# Patient Record
Sex: MALE | Race: OTHER | Employment: FULL TIME | URBAN - METROPOLITAN AREA
[De-identification: names, ages, dates, MRNs, and addresses within clinical notes are randomized per-mention and may not be internally consistent; named-entity substitution may affect disease eponyms.]

---

## 2017-01-23 ENCOUNTER — GENERIC CONVERSION - ENCOUNTER (OUTPATIENT)
Dept: OTHER | Facility: OTHER | Age: 47
End: 2017-01-23

## 2017-01-23 LAB — AMBIG ABBREV CMP14 DEFAULT (HISTORICAL): NORMAL

## 2017-01-24 ENCOUNTER — GENERIC CONVERSION - ENCOUNTER (OUTPATIENT)
Dept: OTHER | Facility: OTHER | Age: 47
End: 2017-01-24

## 2017-01-24 ENCOUNTER — ALLSCRIPTS OFFICE VISIT (OUTPATIENT)
Dept: OTHER | Facility: OTHER | Age: 47
End: 2017-01-24

## 2017-01-24 LAB
A/G RATIO (HISTORICAL): 1.9 (ref 1.1–2.5)
ALBUMIN SERPL BCP-MCNC: 4.7 G/DL (ref 3.5–5.5)
ALP SERPL-CCNC: 54 IU/L (ref 39–117)
ALT SERPL W P-5'-P-CCNC: 39 IU/L (ref 0–44)
AST SERPL W P-5'-P-CCNC: 22 IU/L (ref 0–40)
BILIRUB SERPL-MCNC: 0.3 MG/DL (ref 0–1.2)
BUN SERPL-MCNC: 17 MG/DL (ref 6–24)
BUN/CREA RATIO (HISTORICAL): 13 (ref 9–20)
CALCIUM SERPL-MCNC: 10.4 MG/DL (ref 8.7–10.2)
CHLORIDE SERPL-SCNC: 99 MMOL/L (ref 96–106)
CHOLEST SERPL-MCNC: 172 MG/DL (ref 100–199)
CHOLEST/HDLC SERPL: 3.7 RATIO UNITS (ref 0–5)
CO2 SERPL-SCNC: 25 MMOL/L (ref 18–29)
CREAT SERPL-MCNC: 1.26 MG/DL (ref 0.76–1.27)
EGFR AFRICAN AMERICAN (HISTORICAL): 79 ML/MIN/1.73
EGFR-AMERICAN CALC (HISTORICAL): 68 ML/MIN/1.73
GLUCOSE SERPL-MCNC: 89 MG/DL (ref 65–99)
HDLC SERPL-MCNC: 46 MG/DL
INTERPRETATION (HISTORICAL): NORMAL
LDLC SERPL CALC-MCNC: 99 MG/DL (ref 0–99)
POTASSIUM SERPL-SCNC: 3.9 MMOL/L (ref 3.5–5.2)
SODIUM SERPL-SCNC: 141 MMOL/L (ref 134–144)
TOT. GLOBULIN, SERUM (HISTORICAL): 2.5 G/DL (ref 1.5–4.5)
TOTAL PROTEIN (HISTORICAL): 7.2 G/DL (ref 6–8.5)
TRIGL SERPL-MCNC: 133 MG/DL (ref 0–149)
VLDLC SERPL CALC-MCNC: 27 MG/DL (ref 5–40)

## 2018-01-10 NOTE — RESULT NOTES
Verified Results  [facility]) CMP14+eGFR [id number] 01:13PM Justin Cui     Test Name Result Flag Reference   Glucose, Serum 85 mg/dL  65-99   BUN 18 mg/dL  6-24   Creatinine, Serum 1 21 mg/dL  0 76-1 27   eGFR If NonAfricn Am 72 mL/min/1 73  >59   eGFR If Africn Am 83 mL/min/1 73  >59   BUN/Creatinine Ratio 15  9-20   Sodium, Serum 142 mmol/L  134-144   Potassium, Serum 4 3 mmol/L  3 5-5 2   Chloride, Serum 102 mmol/L     Carbon Dioxide, Total 26 mmol/L  18-29   Calcium, Serum 9 7 mg/dL  8 7-10 2   Protein, Total, Serum 7 0 g/dL  6 0-8 5   Albumin, Serum 4 5 g/dL  3 5-5 5   Globulin, Total 2 5 g/dL  1 5-4 5   A/G Ratio 1 8  1 1-2 5   Bilirubin, Total 0 3 mg/dL  0 0-1 2   Alkaline Phosphatase, S 50 IU/L     AST (SGOT) 25 IU/L  0-40   ALT (SGPT) 40 IU/L  0-44       Discussion/Summary   lab acceptable     Signatures   Electronically signed by : Giuliano Herrmann DO; Mar  1 2016 12:58PM EST                       (Author)

## 2018-01-10 NOTE — PROGRESS NOTES
Assessment    1  Encounter for preventive health examination (V70 0) (Z00 00)   2  Need for vaccination (V05 9) (Z23)   3  Mixed hyperlipidemia (272 2) (E78 2)   4  Stress (V62 89) (F43 9)    Plan  Health Maintenance    · DIGITAL RECTAL EXAM; Status:Complete;   Done: 80DSK7919 06:22PM   · Hemoccult Screening - POC; Status:Complete;   Done: 48QAB1761 06:22PM  Mixed hyperlipidemia    · Atorvastatin Calcium 20 MG Oral Tablet; 1 Every Day At Bedtime   · (1) COMPREHENSIVE METABOLIC PANEL; Status:Active; Requested for:64Ctl9613;    · (1) LIPID PANEL, FASTING; Status:Active; Requested for:82Azl2174;   Need for vaccination    · Fluzone Quadrivalent Intramuscular Suspension; 0 5ml; To Be Done:  72PYH9722  Stress    · Viibryd 20 MG Oral Tablet; 1/2 tab daily for 7 days then 1 tab a day   · Follow-up visit in 6 weeks Evaluation and Treatment  Follow-up  Status: Hold For -  Scheduling  Requested for: 00RFS0268    Discussion/Summary  Impression: health maintenance visit  Testicular cancer screening: the risks and benefits of testicular cancer screening were discussed and clinical testicular exam was done today  Chief Complaint  pt present for CPE  ac/cma      History of Present Illness  HM, Adult Male: The patient is being seen for a health maintenance evaluation  General Health:   Screening:   HPI: as above  here for a full physical  would like flu shot    pt states he has some issues with stress that is affecting is sex life seems to be a little depressed as well  not necessarily interetsed in him being treated      Review of Systems    Constitutional: No fever or chills, feels well, no tiredness, no recent weight gain or weight loss  Eyes: No complaints of eye pain, no red eyes, no discharge from eyes, no itchy eyes  ENT: no complaints of earache, no hearing loss, no nosebleeds, no nasal discharge, no sore throat, no hoarseness     Cardiovascular: No complaints of slow heart rate, no fast heart rate, no chest pain, no palpitations, no leg claudication, no lower extremity  Respiratory: No complaints of shortness of breath, no wheezing, no cough, no SOB on exertion, no orthopnea or PND  Gastrointestinal: No complaints of abdominal pain, no constipation, no nausea or vomiting, no diarrhea or bloody stools  Genitourinary: No complaints of dysuria, no incontinence, no hesitancy, no nocturia, no genital lesion, no testicular pain  Musculoskeletal: No complaints of arthralgia, no myalgias, no joint swelling or stiffness, no limb pain or swelling  Integumentary: No complaints of skin rash or skin lesions, no itching, no skin wound, no dry skin  Neurological: No compliants of headache, no confusion, no convulsions, no numbness or tingling, no dizziness or fainting, no limb weakness, no difficulty walking  Psychiatric: stressed  Endocrine: No complaints of proptosis, no hot flashes, no muscle weakness, no erectile dysfunction, no deepening of the voice, no feelings of weakness  Hematologic/Lymphatic: No complaints of swollen glands, no swollen glands in the neck, does not bleed easily, no easy bruising  Active Problems    1  Encounter for screening for cardiovascular disorders (V81 2) (Z13 6)   2  Encounter for screening for malignant neoplasm of prostate (V76 44) (Z12 5)   3  Erectile dysfunction (607 84) (N52 9)   4  Lumbago (724 2) (M54 5)   5  Mixed hyperlipidemia (272 2) (E78 2)   6  Screening for thyroid disorder (V77 0) (Z13 29)   7  Snoring (786 09) (R06 83)   8   Special screening examination for neoplasm of prostate (V76 44) (Z12 5)    Past Medical History    · History of Blood pressure elevated (401 9) (I10)   · History of Elevated blood pressure reading without diagnosis of hypertension (796 2)  (R03 0)    Surgical History    · History of Reported Prior Surgical / Procedural History    Family History  Father    · Family history of Diabetes   · Family history of Glaucoma   · Family history of HTN (hypertension)    Social History    · Never smoker   · Occupation   ·     Current Meds   1  Atorvastatin Calcium 20 MG Oral Tablet; 1 Every Day At Bedtime; Therapy: [id number] to (Last Rx:25Jan2016)  Requested for: 62GUB1172 Ordered    Allergies    1  No Known Drug Allergies    2  Pollen    Vitals   Recorded: 64KZF0304 11:74DL   Systolic 823   Diastolic 80   Heart Rate 64   Respiration 16   Temperature 96 5 F   Height 5 ft 10 in   Weight 243 lb    BMI Calculated 34 87   BSA Calculated 2 27     Physical Exam    Constitutional   General appearance: No acute distress, well appearing and well nourished  Head and Face   Head and face: Normal     Palpation of the face and sinuses: No sinus tenderness  Eyes   Conjunctiva and lids: No erythema, swelling or discharge  Pupils and irises: Equal, round, reactive to light  Ears, Nose, Mouth, and Throat   External inspection of ears and nose: Normal     Otoscopic examination: Tympanic membranes translucent with normal light reflex  Canals patent without erythema  Neck   Neck: Supple, symmetric, trachea midline, no masses  Pulmonary   Respiratory effort: No increased work of breathing or signs of respiratory distress  Percussion of chest: Normal     Cardiovascular   Auscultation of heart: Normal rate and rhythm, normal S1 and S2, no murmurs  Carotid pulses: 2+ bilaterally  Abdominal aorta: Normal     Femoral pulses: 2+ bilaterally  Pedal pulses: 2+ bilaterally  Peripheral vascular exam: Normal     Examination of extremities for edema and/or varicosities: Normal     Abdomen   Abdomen: Non-tender, no masses  Liver and spleen: No hepatomegaly or splenomegaly  Examination for hernias: No hernias appreciated  Anus, perineum, and rectum: Normal sphincter tone, no masses, no prolapse  Stool sample for occult blood: Negative  Genitourinary   Scrotal contents: Normal testes, no masses  Penis: Normal, no lesions      Digital rectal exam of prostate: Normal size, no masses  Lymphatic   Palpation of lymph nodes in neck: No lymphadenopathy  Palpation of lymph nodes in axillae: No lymphadenopathy  Palpation of lymph nodes in groin: No lymphadenopathy  Palpation of lymph nodes in other areas: No lymphadenopathy      Musculoskeletal   Gait and station: Normal        Results/Data  (1) CBC/PLT/DIFF [id number] 11:53AM Katt Goldstein     Test Name Result Flag Reference   WBC 5 1 x10E3/uL  3 4-10 8   RBC 5 25 x10E6/uL  4 14-5 80   Hemoglobin 14 5 g/dL  12 6-17 7   Hematocrit 43 0 %  37 5-51 0   MCV 82 fL  79-97   MCH 27 6 pg  26 6-33 0   MCHC 33 7 g/dL  31 5-35 7   RDW 13 5 %  12 3-15 4   Platelets 467 J98G5/YY  150-379   Neutrophils 62 %     Lymphs 30 %     Monocytes 6 %     Eos 2 %     Basos 0 %     Neutrophils (Absolute) 3 1 x10E3/uL  1 4-7 0   Lymphs (Absolute) 1 5 x10E3/uL  0 7-3 1   Monocytes(Absolute) 0 3 x10E3/uL  0 1-0 9   Eos (Absolute) 0 1 x10E3/uL  0 0-0 4   Baso (Absolute) 0 0 x10E3/uL  0 0-0 2   Immature Granulocytes 0 %     Immature Grans (Abs) 0 0 x10E3/uL  0 0-0 1     (1) COMPREHENSIVE METABOLIC PANEL [id number] 60:89ZN Katt Goldstein     Test Name Result Flag Reference   Glucose, Serum 89 mg/dL  65-99   BUN 15 mg/dL  6-24   Creatinine, Serum 1 18 mg/dL  0 76-1 27   eGFR If NonAfricn Am 74 mL/min/1 73  >59   eGFR If Africn Am 85 mL/min/1 73  >59   BUN/Creatinine Ratio 13  9-20   Sodium, Serum 142 mmol/L  136-144   Potassium, Serum 4 4 mmol/L  3 5-5 2   Chloride, Serum 102 mmol/L     Carbon Dioxide, Total 25 mmol/L  18-29   Calcium, Serum 9 8 mg/dL  8 7-10 2   Protein, Total, Serum 7 0 g/dL  6 0-8 5   Albumin, Serum 4 6 g/dL  3 5-5 5   Globulin, Total 2 4 g/dL  1 5-4 5   A/G Ratio 1 9  1 1-2 5   Bilirubin, Total 0 3 mg/dL  0 0-1 2   Alkaline Phosphatase, S 48 IU/L     AST (SGOT) 24 IU/L  0-40   ALT (SGPT) 33 IU/L  0-44     (1) LIPID PANEL, FASTING [id number] 11:53AM Katt Goldstein     Test Name Result Flag Reference   Cholesterol, Total 252 mg/dL H 100-199   Triglycerides 195 mg/dL H 0-149   HDL Cholesterol 39 mg/dL L >39   According to ATP-III Guidelines, HDL-C >59 mg/dL is considered a  negative risk factor for CHD  VLDL Cholesterol Micheal 39 mg/dL  5-40   LDL Cholesterol Calc 174 mg/dL H 0-99   T  Chol/HDL Ratio 6 5 ratio units H 0 0-5 0   T  Chol/HDL Ratio                                                             Men  Women                                               1/2 Avg  Risk  3 4    3 3                                                   Avg Risk  5 0    4 4                                                2X Avg  Risk  9 6    7 1                                                3X Avg  Risk 23 4   11 0     (1) PSA (SCREEN) (Dx V76 44 Screen for Prostate Cancer) [id number] 11:53AM Alexandra Kaiser     Test Name Result Flag Reference   Prostate Specific Ag, Serum 0 4 ng/mL  0 0-4 0   Roche ECLIA methodology  According to the American Urological Association, Serum PSA should  decrease and remain at undetectable levels after radical  prostatectomy  The AUA defines biochemical recurrence as an initial  PSA value 0 2 ng/mL or greater followed by a subsequent confirmatory  PSA value 0 2 ng/mL or greater  Values obtained with different assay methods or kits cannot be used  interchangeably  Results cannot be interpreted as absolute evidence  of the presence or absence of malignant disease  (1) TSH [id number] 11:53AM [email or web address]     Test Name Result Flag Reference   TSH 1 840 uIU/mL  0 450-4 500       Procedure    Procedure: Visual Acuity Test    Indication: routine screening  Inforrmation supplied by a Snellen chart     Results: 20/15 in both eyes without corrective device, 20/25 in the right eye without corrective device, 20/20 in the left eye without corrective device      Signatures   Electronically signed by : Matthew Mackey DO; Nov 14 2016  6:34PM EST                       (Author)

## 2018-01-13 VITALS
WEIGHT: 243 LBS | DIASTOLIC BLOOD PRESSURE: 90 MMHG | HEIGHT: 70 IN | BODY MASS INDEX: 34.79 KG/M2 | SYSTOLIC BLOOD PRESSURE: 128 MMHG | RESPIRATION RATE: 16 BRPM | TEMPERATURE: 97.4 F | HEART RATE: 72 BPM

## 2018-01-13 NOTE — RESULT NOTES
Message   will discuss labs at follow up appt     Verified Results  (1) CBC/PLT/DIFF [id number] 11:53AM Jagruti Bone     Test Name Result Flag Reference   WBC 5 1 x10E3/uL  3 4-10 8   RBC 5 25 x10E6/uL  4 14-5 80   Hemoglobin 14 5 g/dL  12 6-17 7   Hematocrit 43 0 %  37 5-51 0   MCV 82 fL  79-97   MCH 27 6 pg  26 6-33 0   MCHC 33 7 g/dL  31 5-35 7   RDW 13 5 %  12 3-15 4   Platelets 579 Q20S5/SN  150-379   Neutrophils 62 %     Lymphs 30 %     Monocytes 6 %     Eos 2 %     Basos 0 %     Neutrophils (Absolute) 3 1 x10E3/uL  1 4-7 0   Lymphs (Absolute) 1 5 x10E3/uL  0 7-3 1   Monocytes(Absolute) 0 3 x10E3/uL  0 1-0 9   Eos (Absolute) 0 1 x10E3/uL  0 0-0 4   Baso (Absolute) 0 0 x10E3/uL  0 0-0 2   Immature Granulocytes 0 %     Immature Grans (Abs) 0 0 x10E3/uL  0 0-0 1     (1) COMPREHENSIVE METABOLIC PANEL [id number] 36:38ZV Jagruti Bone     Test Name Result Flag Reference   Glucose, Serum 89 mg/dL  65-99   BUN 15 mg/dL  6-24   Creatinine, Serum 1 18 mg/dL  0 76-1 27   eGFR If NonAfricn Am 74 mL/min/1 73  >59   eGFR If Africn Am 85 mL/min/1 73  >59   BUN/Creatinine Ratio 13  9-20   Sodium, Serum 142 mmol/L  136-144   Potassium, Serum 4 4 mmol/L  3 5-5 2   Chloride, Serum 102 mmol/L     Carbon Dioxide, Total 25 mmol/L  18-29   Calcium, Serum 9 8 mg/dL  8 7-10 2   Protein, Total, Serum 7 0 g/dL  6 0-8 5   Albumin, Serum 4 6 g/dL  3 5-5 5   Globulin, Total 2 4 g/dL  1 5-4 5   A/G Ratio 1 9  1 1-2 5   Bilirubin, Total 0 3 mg/dL  0 0-1 2   Alkaline Phosphatase, S 48 IU/L     AST (SGOT) 24 IU/L  0-40   ALT (SGPT) 33 IU/L  0-44     (1) LIPID PANEL, FASTING [id number] 11:53AM Jagruti Bone     Test Name Result Flag Reference   Cholesterol, Total 252 mg/dL H 100-199   Triglycerides 195 mg/dL H 0-149   HDL Cholesterol 39 mg/dL L >39   According to ATP-III Guidelines, HDL-C >59 mg/dL is considered a  negative risk factor for CHD  VLDL Cholesterol Micheal 39 mg/dL  5-40   LDL Cholesterol Calc 174 mg/dL H 0-99   T  Chol/HDL Ratio 6 5 ratio units H 0 0-5 0   T  Chol/HDL Ratio                                                             Men  Women                                               1/2 Avg  Risk  3 4    3 3                                                   Avg Risk  5 0    4 4                                                2X Avg  Risk  9 6    7 1                                                3X Avg  Risk 23 4   11 0     (1) PSA (SCREEN) (Dx V76 44 Screen for Prostate Cancer) [id number] 11:53AM [facility]     Test Name Result Flag Reference   Prostate Specific Ag, Serum 0 4 ng/mL  0 0-4 0   Roche ECLIA methodology  According to the American Urological Association, Serum PSA should  decrease and remain at undetectable levels after radical  prostatectomy  The AUA defines biochemical recurrence as an initial  PSA value 0 2 ng/mL or greater followed by a subsequent confirmatory  PSA value 0 2 ng/mL or greater  Values obtained with different assay methods or kits cannot be used  interchangeably  Results cannot be interpreted as absolute evidence  of the presence or absence of malignant disease  (1) TSH [id number] 11:53AM [place] [facility]     Test Name Result Flag Reference   TSH 1 840 uIU/mL  0 450-4 500     [facility]) Cardiovascular Risk Assessment [id number] 11:53AM [place] [facility]     Test Name Result Flag Reference   Interpretation Note     Supplement report is available  PDF Image

## 2018-01-13 NOTE — RESULT NOTES
Verified Results  ([facility]) Lipid Panel With LDL/HDL Ratio [id number] 01:17PM [employer]     Test Name Result Flag Reference   Cholesterol, Total 201 mg/dL H 100-199   Triglycerides 148 mg/dL  0-149   HDL Cholesterol 36 mg/dL L >39   According to ATP-III Guidelines, HDL-C >59 mg/dL is considered a  negative risk factor for CHD  VLDL Cholesterol Micheal 30 mg/dL  5-40   LDL Cholesterol Calc 135 mg/dL H 0-99   LDL/HDL Ratio 3 8 ratio units H 0 0-3 6   LDL/HDL Ratio                                                             Men  Women                                               1/2 Avg  Risk  1 0    1 5                                                   Avg Risk  3 6    3 2                                                2X Avg  Risk  6 2    5 0                                                3X Avg  Risk  8 0    6 1     (LC) CMP14+eGFR [id number] 01:17PM [employer]     Test Name Result Flag Reference   Glucose, Serum 93 mg/dL  65-99   BUN 15 mg/dL  6-24   Creatinine, Serum 1 23 mg/dL  0 76-1 27   eGFR If NonAfricn Am 70 mL/min/1 73  >59   eGFR If Africn Am 81 mL/min/1 73  >59   BUN/Creatinine Ratio 12  9-20   Sodium, Serum 143 mmol/L  134-144   Potassium, Serum 4 4 mmol/L  3 5-5 2   Chloride, Serum 101 mmol/L     Carbon Dioxide, Total 25 mmol/L  18-29   Calcium, Serum 10 0 mg/dL  8 7-10 2   Protein, Total, Serum 7 5 g/dL  6 0-8 5   Albumin, Serum 4 6 g/dL  3 5-5 5   Globulin, Total 2 9 g/dL  1 5-4 5   A/G Ratio 1 6  1 1-2 5   Bilirubin, Total 0 3 mg/dL  0 0-1 2   Alkaline Phosphatase, S 62 IU/L     AST (SGOT) 33 IU/L  0-40   ALT (SGPT) 63 IU/L H 0-44     (LC) Cardiovascular Risk Assessment [id number] 01:17PM [employer]     Test Name Result Flag Reference   Interpretation Note     Supplement report is available  PDF Image            Discussion/Summary   please make appt to discuss labs

## 2018-01-13 NOTE — RESULT NOTES
Verified Results  (1) COMPREHENSIVE METABOLIC PANEL [id number] 18:07SO Leonel Gay     Test Name Result Flag Reference   Glucose, Serum 89 mg/dL  65-99   BUN 17 mg/dL  6-24   Creatinine, Serum 1 26 mg/dL  0 76-1 27   eGFR If NonAfricn Am 68 mL/min/1 73  >59   eGFR If Africn Am 79 mL/min/1 73  >59   BUN/Creatinine Ratio 13  9-20   Sodium, Serum 141 mmol/L  134-144   Potassium, Serum 3 9 mmol/L  3 5-5 2   Chloride, Serum 99 mmol/L     Carbon Dioxide, Total 25 mmol/L  18-29   Calcium, Serum 10 4 mg/dL H 8 7-10 2   Protein, Total, Serum 7 2 g/dL  6 0-8 5   Albumin, Serum 4 7 g/dL  3 5-5 5   Globulin, Total 2 5 g/dL  1 5-4 5   A/G Ratio 1 9  1 1-2 5   Bilirubin, Total 0 3 mg/dL  0 0-1 2   Alkaline Phosphatase, S 54 IU/L     AST (SGOT) 22 IU/L  0-40   ALT (SGPT) 39 IU/L  0-44     [facility] [place] CMP14 Default [id number] 12:07PM Leonel Bigjose     Test Name Result Flag Reference   Mae Barajasing CMP14 Default Comment     A hand-written panel/profile was received from your office  In  accordance with the [facility] Ambiguous Test Code Policy dated July 5802, we have completed your order by using the closest currently  or formerly recognized AMA panel  We have assigned Comprehensive  Metabolic Panel (14), Test Code #929000 to this request   If this  is not the testing you wished to receive on this specimen, please  contact the [address] [place] [address] Client Inquiry/Technical Services Department  to clarify the test order  We appreciate your business  (1) LIPID PANEL, FASTING 23Jan2017 12:07PM Leonel Bigjose     Test Name Result Flag Reference   Cholesterol, Total 172 mg/dL  100-199   Triglycerides 133 mg/dL  0-149   HDL Cholesterol 46 mg/dL  >39   VLDL Cholesterol Micheal 27 mg/dL  5-40   LDL Cholesterol Calc 99 mg/dL  0-99   T  Chol/HDL Ratio 3 7 ratio units  0 0-5 0   T  Chol/HDL Ratio                                                             Men  Women                                               1/2 Avg  Risk  3 4 3 3                                                   Avg Risk  5 0    4 4                                                2X Avg  Risk  9 6    7 1                                                3X Avg  Risk 23 4   11 0     [facility]) Cardiovascular Risk Assessment 23Jan2017 12:07PM Yared Sainz     Test Name Result Flag Reference   Interpretation Note     Supplement report is available  PDF Image            Discussion/Summary   will discuss labs at follow up appt

## 2018-01-14 NOTE — PROGRESS NOTES
Assessment    1  Mixed hyperlipidemia (272 2) (E78 2)   2  Snoring (786 09) (R06 83)    Plan  Mixed hyperlipidemia    · Atorvastatin Calcium 10 MG Oral Tablet (Lipitor)   · Atorvastatin Calcium 20 MG Oral Tablet; 1 Every Day At Bedtime   · (LC) CMP14+eGFR; Status:Active; Requested for:25Apr2016;    · ([facility]) CMP14+eGFR; Status:Hold For - Exact Date; Requested for:Approx [id number];    · ([facility]) Lipid Panel With LDL/HDL Ratio; Status:Active; Requested for:25Apr2016;   Snoring    · *1 - [address] Physician Referral  Consult  Status: Hold For - Scheduling   Requested for: 09TRX9016  Care Summary provided  : Yes    Discussion/Summary    Will follow sleep apnea study  WIll get LFT's in 1 month and three  Chief Complaint  Review labs      History of Present Illness  as above  pt is here to follow on his labs  pt feels well    Pt states he has an issue with snoring, does find he is tired during the day      Review of Systems    Constitutional: No fever or chills, feels well, no tiredness, no recent weight gain or weight loss  Eyes: No complaints of eye pain, no red eyes, no discharge from eyes, no itchy eyes  ENT: no complaints of earache, no hearing loss, no nosebleeds, no nasal discharge, no sore throat, no hoarseness  Cardiovascular: No complaints of slow heart rate, no fast heart rate, no chest pain, no palpitations, no leg claudication, no lower extremity  Respiratory: No complaints of shortness of breath, no wheezing, no cough, no SOB on exertion, no orthopnea or PND  Gastrointestinal: No complaints of abdominal pain, no constipation, no nausea or vomiting, no diarrhea or bloody stools  Genitourinary: No complaints of dysuria, no incontinence, no hesitancy, no nocturia, no genital lesion, no testicular pain  Active Problems    1  Erectile dysfunction (607 84) (N52 9)   2  Lumbago (724 2) (M54 5)   3  Mixed hyperlipidemia (272 2) (E78 2)   4  Screening for thyroid disorder (V77 0) (Z13 29)   5  Special screening examination for neoplasm of prostate (V76 44) (Z12 5)    Past Medical History    1  History of Blood pressure elevated (796 2) (I10)   2  History of Elevated blood pressure reading without diagnosis of hypertension (796 2)   (R03 0)    The active problems and past medical history were reviewed and updated today  Surgical History    1  History of Reported Prior Surgical / Procedural History    The surgical history was reviewed and updated today  Family History    1  Family history of Diabetes   2  Family history of Glaucoma   3  Family history of HTN (hypertension)    The family history was reviewed and updated today  Social History    · Never smoker   · Occupation  The social history was reviewed and updated today  Current Meds   1  Atorvastatin Calcium 10 MG Oral Tablet; TAKE 1 TABLET DAILY AT BEDTIME; Therapy: 90TSV8670 to (Evaluate:43Gro6796)  Requested for: 25XMW0941; Last   Rx:[id number] Ordered   2  Levitra 20 MG Oral Tablet; one pill 1 hr prior to intercourse; Therapy: 91RXK9816 to (Last Rx:06Apr2015)  Requested for: 05Xcz6870 Ordered    The medication list was reviewed and updated today  Allergies    1  No Known Drug Allergies    2  Pollen    Vitals  Vital Signs [Data Includes: Current Encounter]    Recorded: 35PUM2428 03:39PM   Temperature 95 5 F   Heart Rate 78   Respiration 16   Systolic 379   Diastolic 86   Height 5 ft 10 in   Weight 245 lb    BMI Calculated 35 15   BSA Calculated 2 28     Physical Exam    Constitutional   General appearance: No acute distress, well appearing and well nourished  Eyes   Conjunctiva and lids: No swelling, erythema, or discharge  Pupils and irises: Equal, round and reactive to light  Ears, Nose, Mouth, and Throat   External inspection of ears and nose: Normal     Otoscopic examination: Tympanic membrance translucent with normal light reflex  Canals patent without erythema      Nasal mucosa, septum, and turbinates: Normal without edema or erythema  Pulmonary   Auscultation of lungs: Clear to auscultation, equal breath sounds bilaterally, no wheezes, no rales, no rhonci  Cardiovascular   Auscultation of heart: Normal rate and rhythm, normal S1 and S2, without murmurs  Examination of extremities for edema and/or varicosities: Normal     Carotid pulses: Normal     Abdomen   Abdomen: Non-tender, no masses  Musculoskeletal   Gait and station: Normal     Skin   Skin and subcutaneous tissue: Normal without rashes or lesions  Neurologic   Cranial nerves: Cranial nerves 2-12 intact  Reflexes: 2+ and symmetric  Psychiatric   Orientation to person, place and time: Normal          Results/Data  Results   () Lipid Panel With LDL/HDL Ratio 23SSH7968 01:17PM [employer]     Test Name Result Flag Reference   Cholesterol, Total 201 mg/dL H 100-199   Triglycerides 148 mg/dL  0-149   HDL Cholesterol 36 mg/dL L >39   According to ATP-III Guidelines, HDL-C >59 mg/dL is considered a  negative risk factor for CHD  VLDL Cholesterol Micheal 30 mg/dL  5-40   LDL Cholesterol Calc 135 mg/dL H 0-99   LDL/HDL Ratio 3 8 ratio units H 0 0-3 6   LDL/HDL Ratio                                                             Men  Women                                               1/2 Avg  Risk  1 0    1 5                                                   Avg Risk  3 6    3 2                                                2X Avg  Risk  6 2    5 0                                                3X Avg  Risk  8 0    6 1     () CMP14+eGFR [id number] 01:17PM [employer]     Test Name Result Flag Reference   Glucose, Serum 93 mg/dL  65-99   BUN 15 mg/dL  6-24   Creatinine, Serum 1 23 mg/dL  0 76-1 27   eGFR If NonAfricn Am 70 mL/min/1 73  >59   eGFR If Africn Am 81 mL/min/1 73  >59   BUN/Creatinine Ratio 12  9-20   Sodium, Serum 143 mmol/L  134-144   Potassium, Serum 4 4 mmol/L  3 5-5 2   Chloride, Serum 101 mmol/L     Carbon Dioxide, Total 25 mmol/L  18-29   Calcium, Serum 10 0 mg/dL  8 7-10 2   Protein, Total, Serum 7 5 g/dL  6 0-8 5   Albumin, Serum 4 6 g/dL  3 5-5 5   Globulin, Total 2 9 g/dL  1 5-4 5   A/G Ratio 1 6  1 1-2 5   Bilirubin, Total 0 3 mg/dL  0 0-1 2   Alkaline Phosphatase, S 62 IU/L     AST (SGOT) 33 IU/L  0-40   ALT (SGPT) 63 IU/L H 0-44     Signatures   Electronically signed by : Sebastián Pagan DO; Jan 25 2016  3:56PM EST                       (Author)

## 2018-01-16 NOTE — RESULT NOTES
Verified Results  ([facility]) Lipid Panel With LDL/HDL Ratio 25Apr2016 01:29PM Mary Kay Davidson     Test Name Result Flag Reference   Cholesterol, Total 160 mg/dL  100-199   Triglycerides 130 mg/dL  0-149   HDL Cholesterol 38 mg/dL L >39   According to ATP-III Guidelines, HDL-C >59 mg/dL is considered a  negative risk factor for CHD  VLDL Cholesterol Micheal 26 mg/dL  5-40   LDL Cholesterol Calc 96 mg/dL  0-99   LDL/HDL Ratio 2 5 ratio units  0 0-3 6   LDL/HDL Ratio                                                             Men  Women                                               1/2 Avg  Risk  1 0    1 5                                                   Avg Risk  3 6    3 2                                                2X Avg  Risk  6 2    5 0                                                3X Avg  Risk  8 0    6 1     (LC) CMP14+eGFR 25Apr2016 01:29PM Mary Kay Davidson     Test Name Result Flag Reference   Glucose, Serum 88 mg/dL  65-99   BUN 21 mg/dL  6-24   Creatinine, Serum 1 27 mg/dL  0 76-1 27   eGFR If NonAfricn Am 68 mL/min/1 73  >59   eGFR If Africn Am 78 mL/min/1 73  >59   BUN/Creatinine Ratio 17  9-20   Sodium, Serum 141 mmol/L  134-144   Potassium, Serum 4 4 mmol/L  3 5-5 2   Chloride, Serum 103 mmol/L     Carbon Dioxide, Total 21 mmol/L  18-29   Calcium, Serum 9 7 mg/dL  8 7-10 2   Protein, Total, Serum 7 0 g/dL  6 0-8 5   Albumin, Serum 4 3 g/dL  3 5-5 5   Globulin, Total 2 7 g/dL  1 5-4 5   A/G Ratio 1 6  1 1-2 5   Bilirubin, Total 0 5 mg/dL  0 0-1 2   Alkaline Phosphatase, S 50 IU/L     AST (SGOT) 25 IU/L  0-40   ALT (SGPT) 42 IU/L  0-44     [facility]) Cardiovascular Risk Assessment 25Apr2016 01:29PM Mary Kay Davidson     Test Name Result Flag Reference   Interpretation Note     Supplement report is available  PDF Image            Discussion/Summary   Will discuss labs at follow up appt

## 2020-09-25 ENCOUNTER — TELEPHONE (OUTPATIENT)
Dept: FAMILY MEDICINE CLINIC | Facility: CLINIC | Age: 50
End: 2020-09-25

## 2021-03-11 ENCOUNTER — OFFICE VISIT (OUTPATIENT)
Dept: FAMILY MEDICINE CLINIC | Facility: CLINIC | Age: 51
End: 2021-03-11
Payer: COMMERCIAL

## 2021-03-11 VITALS
RESPIRATION RATE: 16 BRPM | TEMPERATURE: 98.5 F | HEIGHT: 70 IN | BODY MASS INDEX: 36.51 KG/M2 | HEART RATE: 73 BPM | WEIGHT: 255 LBS | OXYGEN SATURATION: 97 % | DIASTOLIC BLOOD PRESSURE: 100 MMHG | SYSTOLIC BLOOD PRESSURE: 140 MMHG

## 2021-03-11 DIAGNOSIS — Z11.4 SCREENING FOR HIV (HUMAN IMMUNODEFICIENCY VIRUS): ICD-10-CM

## 2021-03-11 DIAGNOSIS — Z12.11 SCREEN FOR COLON CANCER: ICD-10-CM

## 2021-03-11 DIAGNOSIS — Z12.5 SCREENING FOR PROSTATE CANCER: ICD-10-CM

## 2021-03-11 DIAGNOSIS — E66.01 SEVERE OBESITY (BMI 35.0-39.9) WITH COMORBIDITY (HCC): ICD-10-CM

## 2021-03-11 DIAGNOSIS — Z23 ENCOUNTER FOR IMMUNIZATION: ICD-10-CM

## 2021-03-11 DIAGNOSIS — Z13.6 SCREENING FOR CARDIOVASCULAR CONDITION: ICD-10-CM

## 2021-03-11 DIAGNOSIS — Z00.00 WELL ADULT EXAM: Primary | ICD-10-CM

## 2021-03-11 DIAGNOSIS — I10 ESSENTIAL HYPERTENSION: ICD-10-CM

## 2021-03-11 DIAGNOSIS — E78.2 MIXED HYPERLIPIDEMIA: ICD-10-CM

## 2021-03-11 DIAGNOSIS — E66.9 OBESITY (BMI 35.0-39.9 WITHOUT COMORBIDITY): ICD-10-CM

## 2021-03-11 PROCEDURE — 3725F SCREEN DEPRESSION PERFORMED: CPT | Performed by: FAMILY MEDICINE

## 2021-03-11 PROCEDURE — 99386 PREV VISIT NEW AGE 40-64: CPT | Performed by: FAMILY MEDICINE

## 2021-03-11 PROCEDURE — 90471 IMMUNIZATION ADMIN: CPT

## 2021-03-11 PROCEDURE — 1036F TOBACCO NON-USER: CPT | Performed by: FAMILY MEDICINE

## 2021-03-11 PROCEDURE — 3008F BODY MASS INDEX DOCD: CPT | Performed by: FAMILY MEDICINE

## 2021-03-11 PROCEDURE — 90715 TDAP VACCINE 7 YRS/> IM: CPT
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
## 2021-03-23 ENCOUNTER — TELEPHONE (OUTPATIENT)
Dept: FAMILY MEDICINE CLINIC | Facility: CLINIC | Age: 51
End: 2021-03-23

## 2021-03-23 NOTE — TELEPHONE ENCOUNTER
[facility] EMERGENCY [facility]    Patient needs a letter for social security stating he exists and is your patient  Please call when ready

## 2021-03-31 ENCOUNTER — IMMUNIZATIONS (OUTPATIENT)
Dept: FAMILY MEDICINE CLINIC | Facility: HOSPITAL | Age: 51
End: 2021-03-31

## 2021-03-31 DIAGNOSIS — Z23 ENCOUNTER FOR IMMUNIZATION: Primary | ICD-10-CM

## 2021-03-31 PROCEDURE — 0011A SARS-COV-2 / COVID-19 MRNA VACCINE (MODERNA) 100 MCG: CPT

## 2021-03-31 PROCEDURE — 91301 SARS-COV-2 / COVID-19 MRNA VACCINE (MODERNA) 100 MCG: CPT

## 2021-04-02 ENCOUNTER — RA CDI HCC (OUTPATIENT)
Dept: OTHER | Facility: HOSPITAL | Age: 51
End: 2021-04-02

## 2021-04-02 PROBLEM — E66.01 CLASS 2 SEVERE OBESITY WITH BODY MASS INDEX (BMI) OF 35 TO 39.9 WITH SERIOUS COMORBIDITY (HCC): Status: ACTIVE | Noted: 2021-04-02

## 2021-04-02 NOTE — PROGRESS NOTES
[facility]  coding oppertunities          Chart reviewed, no opportunity found: CHART REVIEWED, NO OPPORTUNITY FOUND

## 2021-04-12 ENCOUNTER — TELEPHONE (OUTPATIENT)
Dept: GASTROENTEROLOGY | Facility: CLINIC | Age: 51
End: 2021-04-12

## 2021-04-12 DIAGNOSIS — Z12.11 SPECIAL SCREENING FOR MALIGNANT NEOPLASMS, COLON: Primary | ICD-10-CM

## 2021-04-12 RX ORDER — SODIUM, POTASSIUM,MAG SULFATES 17.5-3.13G
SOLUTION, RECONSTITUTED, ORAL ORAL
Qty: 2 BOTTLE | Refills: 0 | Status: SHIPPED | OUTPATIENT
Start: 2021-04-12

## 2021-04-12 NOTE — TELEPHONE ENCOUNTER
Passed oa  Colonoscopy scheduled 05/24/21 @[facility] with Dr Marielena Leonardo instructions given to pt verbally/mailed  Covid testing sheet mailed  04/12/21  Screened by: Gustavo Pope    Referring Provider Nancy Lopez    Pre- Screening: There is no height or weight on file to calculate BMI  Has patient been referred for a routine screening Colonoscopy? yes  Is the patient between 39-70 years old? yes      Previous Colonoscopy no   If yes:    Date:     Facility:     Reason:       SCHEDULING STAFF: If the patient is between 45yrs-49yrs, please advise patient to confirm benefits/coverage with their insurance company for a routine screening colonoscopy, some insurance carriers will only cover at [address] or older  If the patient is over 66years old, please schedule an office visit  Does the patient want to see a Gastroenterologist prior to their procedure OR are they having any GI symptoms? no    Has the patient been hospitalized or had abdominal surgery in the past 6 months? no    Does the patient use supplemental oxygen? no    Does the patient take Coumadin, Lovenox, Plavix, Elliquis, Xarelto, or other blood thinning medication? no    Has the patient had a stroke, cardiac event, or stent placed in the past year? no    SCHEDULING STAFF: If patient answers NO to above questions, then schedule procedure  If patient answers YES to above questions, then schedule office appointment  If patient is between 45yrs - 49yrs, please advise patient that we will have to confirm benefits & coverage with their insurance company for a routine screening colonoscopy

## 2021-05-03 ENCOUNTER — IMMUNIZATIONS (OUTPATIENT)
Dept: FAMILY MEDICINE CLINIC | Facility: HOSPITAL | Age: 51
End: 2021-05-03

## 2021-05-03 DIAGNOSIS — Z23 ENCOUNTER FOR IMMUNIZATION: Primary | ICD-10-CM

## 2021-05-03 PROCEDURE — 0012A SARS-COV-2 / COVID-19 MRNA VACCINE (MODERNA) 100 MCG: CPT

## 2021-05-03 PROCEDURE — 91301 SARS-COV-2 / COVID-19 MRNA VACCINE (MODERNA) 100 MCG: CPT

## 2021-05-18 ENCOUNTER — TELEPHONE (OUTPATIENT)
Dept: GASTROENTEROLOGY | Facility: CLINIC | Age: 51
End: 2021-05-18

## 2021-05-18 NOTE — TELEPHONE ENCOUNTER
Left message to reschedule procedure for 05/24/21  Pre-op called stating the patient wished to cancel  Direct line given

## 2021-05-18 NOTE — TELEPHONE ENCOUNTER
Patient rescheduled colonoscopy to 08/20/21 with Dr Kathia Meraz at [facility]  Patient is aware he needs COVID test 08/14/21

## 2021-05-24 ENCOUNTER — HOSPITAL ENCOUNTER (OUTPATIENT)
Dept: GASTROENTEROLOGY | Facility: AMBULARY SURGERY CENTER | Age: 51
Setting detail: OUTPATIENT SURGERY
Discharge: HOME | End: 2021-05-24
Attending: INTERNAL MEDICINE

## 2021-06-07 ENCOUNTER — TELEPHONE (OUTPATIENT)
Dept: GASTROENTEROLOGY | Facility: CLINIC | Age: 51
End: 2021-06-07

## 2021-06-07 NOTE — TELEPHONE ENCOUNTER
Dr Kathia Meraz no longer available 08/20/21 to scope  Rescheduled patient to 09/17/21 at [facility]  New prep instructions mailed

## 2021-09-14 ENCOUNTER — TELEPHONE (OUTPATIENT)
Dept: PREADMISSION TESTING | Facility: HOSPITAL | Age: 51
End: 2021-09-14

## 2021-09-14 ENCOUNTER — TELEPHONE (OUTPATIENT)
Dept: GASTROENTEROLOGY | Facility: CLINIC | Age: 51
End: 2021-09-14

## 2021-09-14 VITALS — HEIGHT: 71 IN | BODY MASS INDEX: 35.14 KG/M2 | WEIGHT: 251 LBS

## 2021-09-17 ENCOUNTER — HOSPITAL ENCOUNTER (OUTPATIENT)
Dept: GASTROENTEROLOGY | Facility: AMBULARY SURGERY CENTER | Age: 51
Setting detail: OUTPATIENT SURGERY
Discharge: HOME | End: 2021-09-17
Attending: INTERNAL MEDICINE | Admitting: INTERNAL MEDICINE
Payer: COMMERCIAL

## 2021-09-17 ENCOUNTER — ANESTHESIA EVENT (OUTPATIENT)
Dept: GASTROENTEROLOGY | Facility: AMBULARY SURGERY CENTER | Age: 51
End: 2021-09-17

## 2021-09-17 ENCOUNTER — ANESTHESIA (OUTPATIENT)
Dept: GASTROENTEROLOGY | Facility: AMBULARY SURGERY CENTER | Age: 51
End: 2021-09-17

## 2021-09-17 VITALS
HEIGHT: 71 IN | DIASTOLIC BLOOD PRESSURE: 75 MMHG | HEART RATE: 62 BPM | BODY MASS INDEX: 35.14 KG/M2 | SYSTOLIC BLOOD PRESSURE: 123 MMHG | TEMPERATURE: 98.2 F | RESPIRATION RATE: 16 BRPM | OXYGEN SATURATION: 100 % | WEIGHT: 251 LBS

## 2021-09-17 DIAGNOSIS — Z12.11 SPECIAL SCREENING FOR MALIGNANT NEOPLASMS, COLON: ICD-10-CM

## 2021-09-17 PROCEDURE — 88305 TISSUE EXAM BY PATHOLOGIST: CPT | Performed by: PATHOLOGY

## 2021-09-17 PROCEDURE — 45385 COLONOSCOPY W/LESION REMOVAL: CPT | Performed by: INTERNAL MEDICINE

## 2021-09-17 PROCEDURE — 45380 COLONOSCOPY AND BIOPSY: CPT | Performed by: INTERNAL MEDICINE

## 2021-09-17 RX ORDER — PROPOFOL 10 MG/ML
INJECTION, EMULSION INTRAVENOUS AS NEEDED
Status: DISCONTINUED | OUTPATIENT
Start: 2021-09-17 | End: 2021-09-17

## 2021-09-17 RX ORDER — SODIUM CHLORIDE, SODIUM LACTATE, POTASSIUM CHLORIDE, CALCIUM CHLORIDE 600; 310; 30; 20 MG/100ML; MG/100ML; MG/100ML; MG/100ML
125 INJECTION, SOLUTION INTRAVENOUS CONTINUOUS
Status: DISCONTINUED | OUTPATIENT
Start: 2021-09-17 | End: 2021-09-21 | Stop reason: HOSPADM

## 2021-09-17 RX ORDER — PROPOFOL 10 MG/ML
INJECTION, EMULSION INTRAVENOUS CONTINUOUS PRN
Status: DISCONTINUED | OUTPATIENT
Start: 2021-09-17 | End: 2021-09-17

## 2021-09-17 RX ADMIN — PROPOFOL 130 MCG/KG/MIN: 10 INJECTION, EMULSION INTRAVENOUS at 12:10

## 2021-09-17 RX ADMIN — PROPOFOL 100 MG: 10 INJECTION, EMULSION INTRAVENOUS at 12:10

## 2021-09-17 RX ADMIN — SODIUM CHLORIDE, SODIUM LACTATE, POTASSIUM CHLORIDE, AND CALCIUM CHLORIDE 125 ML/HR: .6; .31; .03; .02 INJECTION, SOLUTION INTRAVENOUS at 11:06

## 2021-09-17 NOTE — DISCHARGE INSTRUCTIONS
col  Colonoscopy   WHAT YOU NEED TO KNOW:   A colonoscopy is a procedure to examine the inside of your colon (intestine) with a scope  Polyps or tissue growths may have been removed during your colonoscopy  It is normal to feel bloated and to have some abdominal discomfort  You should be passing gas  If you have hemorrhoids or you had polyps removed, you may have a small amount of bleeding  DISCHARGE INSTRUCTIONS:   Call your doctor if:   · You have a large amount of bright red blood in your bowel movements  · Your abdomen is hard and firm and you have severe pain  · You have sudden trouble breathing  · You develop a rash or hives  · You have a fever within 24 hours of your procedure  · You have not had a bowel movement for 3 days after your procedure  · You have questions or concerns about your condition or care  After your colonoscopy:   · Do not lift, strain, or run  for 3 days  · Rest as much as possible  You have been given medicine to relax you  Do not  drive or make important decisions for at least 24 hours  Return to your normal activity as directed  · Relieve gas and discomfort from bloating  by lying on your left side with a heating pad on your abdomen  You may need to take short walks to help the gas move out  Eat small meals until bloating is relieved  If you had polyps removed: For 7 days after your procedure:  · Do not  take aspirin  · Do not  go on long car rides  Help prevent constipation:   · Eat a variety of healthy foods  Healthy foods include fruit, vegetables, whole-grain breads, low-fat dairy products, beans, lean meat, and fish  Ask if you need to be on a special diet  Your healthcare provider may recommend that you eat high-fiber foods such as cooked beans  Fiber helps you have regular bowel movements  · Drink liquids as directed  Adults should drink between 9 and 13 eight-ounce cups of liquid every day  Ask what amount is best for you   For most people, good liquids to drink are water, juice, and milk  · Exercise as directed  Talk to your healthcare provider about the best exercise plan for you  Exercise can help prevent constipation, decrease your blood pressure and improve your health  Follow up with your healthcare provider as directed:  Write down your questions so you remember to ask them during your visits  © Copyright [employer] 2021 Information is for End User's use only and may not be sold, redistributed or otherwise used for commercial purposes  All illustrations and images included in CareNotes® are the copyrighted property of [employer]  or [address] [place], [place] [address]aman lucretia   The above information is an  only  It is not intended as medical advice for individual conditions or treatments  Talk to your doctor, nurse or pharmacist before following any medical regimen to see if it is safe and effective for you

## 2021-09-17 NOTE — ANESTHESIA POSTPROCEDURE EVALUATION
Post-Op Assessment Note    CV Status:  Stable    Pain management: adequate     Mental Status:  Alert and awake   Hydration Status:  Euvolemic   PONV Controlled:  Controlled   Airway Patency:  Patent      Post Op Vitals Reviewed: Yes      Staff: CRNA         No complications documented      BP  129/74   Temp      Pulse 84   Resp   18   SpO2   99

## 2021-09-17 NOTE — H&P
History and Physical - [facility]  Renae Juarez 46 y o  male MRN: [id number]    HPI: Renae Juaerz is a 46y o  year old male who presents with screening colonosocpy  Review of Systems    Historical Information   Past Medical History:   Diagnosis Date    COVID-19 vaccine series completed     Hypertension     had a stressfull situation-now not taking metoprolol tartrate    Obesity (BMI 35 0-39 9 without comorbidity)     Wears partial dentures     lower     Past Surgical History:   Procedure Laterality Date    TOOTH EXTRACTION      in the 1990's     Social History   Social History     Substance and Sexual Activity   Alcohol Use Yes    Comment: occ     Social History     Substance and Sexual Activity   Drug Use Not Currently    Types: Marijuana     Social History     Tobacco Use   Smoking Status Never Smoker   Smokeless Tobacco Never Used     Family History   Problem Relation Age of Onset    No Known Problems Mother     Glaucoma Father     Diabetes Father     Hypertension Father        Meds/Allergies     (Not in a hospital admission)      Allergies   Allergen Reactions    Pollen Extract Allergic Rhinitis       Objective     BP (!) 173/92   Pulse (!) 50   Temp 98 2 °F (36 8 °C) (Tympanic)   Resp 16   Ht 5' 11" (1 803 m)   Wt 114 kg (251 lb)   SpO2 99%   BMI 35 01 kg/m²       PHYSICAL EXAM    Gen: NAD  CV: RRR  CHEST: Clear  ABD: soft, NT/ND  EXT: no edema  Neuro: AAO      ASSESSMENT/PLAN:  This is a 46y o  year old male here for screening colonosocpy         PLAN:   Procedure: colonoscopy

## 2021-09-17 NOTE — ANESTHESIA PREPROCEDURE EVALUATION
Procedure:  COLONOSCOPY    Relevant Problems   CARDIO   (+) Essential hypertension   (+) Mixed hyperlipidemia        Physical Exam    Airway    Mallampati score: II  TM Distance: >3 FB  Neck ROM: full     Dental   No notable dental hx     Cardiovascular  Cardiovascular exam normal    Pulmonary  Pulmonary exam normal     Other Findings        Anesthesia Plan  ASA Score- 2     Anesthesia Type- IV sedation with anesthesia with ASA Monitors  Additional Monitors:   Airway Plan:           Plan Factors-Exercise tolerance (METS): >4 METS  Chart reviewed  Imaging results reviewed  Existing labs reviewed  Patient summary reviewed  Patient is not a current smoker  Induction-     Postoperative Plan-     Informed Consent- Anesthetic plan and risks discussed with patient  I personally reviewed this patient with the CRNA  Discussed and agreed on the Anesthesia Plan with the CRNA  Avelina Armendariz

## 2021-10-07 ENCOUNTER — TELEPHONE (OUTPATIENT)
Dept: GASTROENTEROLOGY | Facility: CLINIC | Age: 51
End: 2021-10-07